# Patient Record
Sex: FEMALE
[De-identification: names, ages, dates, MRNs, and addresses within clinical notes are randomized per-mention and may not be internally consistent; named-entity substitution may affect disease eponyms.]

---

## 2018-07-24 ENCOUNTER — HOSPITAL ENCOUNTER (EMERGENCY)
Dept: HOSPITAL 14 - H.ER | Age: 53
Discharge: HOME | End: 2018-07-24
Payer: COMMERCIAL

## 2018-07-24 VITALS — OXYGEN SATURATION: 99 %

## 2018-07-24 VITALS
RESPIRATION RATE: 16 BRPM | HEART RATE: 64 BPM | DIASTOLIC BLOOD PRESSURE: 88 MMHG | SYSTOLIC BLOOD PRESSURE: 144 MMHG | TEMPERATURE: 98 F

## 2018-07-24 DIAGNOSIS — M25.511: Primary | ICD-10-CM

## 2018-07-24 NOTE — ED PDOC
Upper Extremity Pain/Injury


Time Seen by Provider: 07/24/18 19:23


Chief Complaint (Nursing): Upper Extremity Problem/Injury


Chief Complaint (Provider): Upper Extremity Problem/Injury


History Per: Patient


History/Exam Limitations: no limitations


Onset/Duration Of Symptoms: Days (x15)


Current Symptoms Are (Timing): Still Present


Additional Complaint(s): 


52 y/o female with no significant PMHx presents to the ED complaining of right 

shoulder pain, since 2015. Patient reports pain began after heavy lifting 

objects while at work. Patient reports of taking Ibuprofen with not much 

relief. Patient states pain is worse with abduction.








PMD: None Provided





Past Medical History


Reviewed: Historical Data, Nursing Documentation, Vital Signs


Vital Signs: 


 Last Vital Signs











Temp  98 F   07/24/18 19:10


 


Pulse  62   07/24/18 19:10


 


Resp  18   07/24/18 19:10


 


BP  155/97 H  07/24/18 19:10


 


Pulse Ox  99   07/24/18 19:10














- Medical History


PMH: No Chronic Diseases





- Surgical History


Surgical History: No Surg Hx





- Family History


Family History: States: Unknown Family Hx





- Home Medications


Home Medications: 


 Ambulatory Orders











 Medication  Instructions  Recorded


 


Ibuprofen [Motrin] 600 mg PO Q6 #20 tab 07/24/18














- Allergies


Allergies/Adverse Reactions: 


 Allergies











Allergy/AdvReac Type Severity Reaction Status Date / Time


 


No Known Allergies Allergy   Verified 07/24/18 19:10














Review of Systems


ROS Statement: Except As Marked, All Systems Reviewed And Found Negative


Musculoskeletal: Positive for: Shoulder Pain (right)





Physical Exam





- Reviewed


Nursing Documentation Reviewed: Yes


Vital Signs Reviewed: Yes





- Physical Exam


Appears: Positive for: No Acute Distress


Head Exam: Positive for: ATRAUMATIC, NORMOCEPHALIC


Skin: Positive for: Normal Color, Warm, Dry


Eye Exam: Positive for: Normal appearance, EOMI, PERRL


ENT: Positive for: Normal ENT Inspection


Neck: Positive for: Normal, Painless ROM


Cardiovascular/Chest: Positive for: Regular Rate, Rhythm


Respiratory: Positive for: Normal Breath Sounds.  Negative for: Respiratory 

Distress


Extremity: Positive for: Tenderness (to lateral aspect of shoulder)


Neurologic/Psych: Positive for: Alert, Oriented (x3).  Negative for: Motor/

Sensory Deficits





- ECG


O2 Sat by Pulse Oximetry: 99 (RA)





Medical Decision Making


Medical Decision Making: 


Time: 1942


Plan:


-- Motrin 600 mg PO


-- Right Shoulder XR





XR: (+) mild DJD, NAD, as read by MALI





_____________________________________________________________________________


Scribe Attestation:


Documented by Hedy Mitchell, acting as a scribe for Josette Mcduffie PA-C.








Provider Scribe Attestation:


All medical record entries made by the Scribe were at my direction and 

personally dictated by me. I have reviewed the chart and agree that the record 

accurately reflects my personal performance of the history, physical exam, 

medical decision making, and the department course for this patient. I have 

also personally directed, reviewed, and agree with the discharge instructions 

and disposition.








Disposition





- Clinical Impression


Clinical Impression: 


 Shoulder pain








- Patient ED Disposition


Is Patient to be Admitted: No





- Disposition


Disposition: Routine/Home


Disposition Time: 20:43


Condition: STABLE


Prescriptions: 


Ibuprofen [Motrin] 600 mg PO Q6 #20 tab


Instructions:  Shoulder Pain (DC)


Forms:  [facility] (English)





- POA


Present On Arrival: None

## 2018-07-25 NOTE — RAD
Date of service: 



07/24/2018



PROCEDURE:  Radiographs of the Right Shoulder



HISTORY:

pain, atraumtic







COMPARISON:

No prior.



FINDINGS:



BONES:

No acute fracture or destructive bony lesion identified.



JOINTS:

No subluxation or dislocation.  Limited degenerative changes seen at 

the acromioclavicular joint with the glenohumeral joint unremarkable.



SOFT TISSUES:

Normal.



OTHER FINDINGS:

None. 



IMPRESSION:

No acute fracture or dislocation. Limited degenerative change 

acromioclavicular joint.

## 2018-09-14 ENCOUNTER — HOSPITAL ENCOUNTER (EMERGENCY)
Dept: HOSPITAL 14 - H.ER | Age: 53
Discharge: HOME | End: 2018-09-14
Payer: COMMERCIAL

## 2018-09-14 VITALS — DIASTOLIC BLOOD PRESSURE: 71 MMHG | TEMPERATURE: 98.1 F | SYSTOLIC BLOOD PRESSURE: 114 MMHG

## 2018-09-14 VITALS — RESPIRATION RATE: 18 BRPM | HEART RATE: 67 BPM

## 2018-09-14 VITALS — OXYGEN SATURATION: 99 %

## 2018-09-14 DIAGNOSIS — L29.9: Primary | ICD-10-CM

## 2018-09-14 NOTE — ED PDOC
HPI: General Adult


Time Seen by Provider: 18 15:44


Chief Complaint (Nursing): ENT Problem


Chief Complaint (Provider): itching to eyes and ears


History Per: Patient,  ( [id number])


Additional Complaint(s): 


53-year-old female with no past medical history presents with itching to both 

eyes and ears ongoing for several months. Patient denies any vision or hearing 

loss. No fever or chills. No dizziness or headache.





PMD: none





Past Medical History


Reviewed: Historical Data, Nursing Documentation, Vital Signs


Vital Signs: 





 Last Vital Signs











Temp  98 F   18 15:45


 


Pulse  67   18 15:45


 


Resp  18   18 15:45


 


BP  137/83   18 15:45


 


Pulse Ox  99   18 15:45














- Medical History


PMH: No Chronic Diseases





- Surgical History


Surgical History: 





- Family History


Family History: States: No Known Family Hx





- Living Arrangements


Living Arrangements: With Family





- Social History


Current smoker - smoking cessation education provided: No


Alcohol: None


Drugs: Denies





- Home Medications


Home Medications: 


 Ambulatory Orders











 Medication  Instructions  Recorded


 


Ibuprofen [Motrin] 600 mg PO Q6 #20 tab 18


 


Ketotifen Fumarate [Zaditor] 5 ml OU BID #1 bottle 18


 


hydrOXYzine HCl [Atarax] 25 mg PO Q6H PRN #30 tab 18














- Allergies


Allergies/Adverse Reactions: 


 Allergies











Allergy/AdvReac Type Severity Reaction Status Date / Time


 


No Known Allergies Allergy   Verified 18 15:45














Review of Systems


ROS Statement: Except As Marked, All Systems Reviewed And Found Negative


Constitutional: Negative for: Fever, Chills


Eyes: Positive for: Other (itchiness to both eyes)


ENT: Positive for: Other (itchiness to both ears)


Cardiovascular: Negative for: Chest Pain


Respiratory: Negative for: Cough


Gastrointestinal: Negative for: Nausea, Vomiting


Genitourinary Female: Negative for: Dysuria, Frequency


Neurological: Negative for: Headache, Dizziness





Physical Exam





- Reviewed


Nursing Documentation Reviewed: Yes


Vital Signs Reviewed: Yes





- Physical Exam


Appears: Positive for: Well, Non-toxic, No Acute Distress


Skin: Positive for: Normal Color.  Negative for: Rash


Eye Exam: Positive for: EOMI, PERRL, Conjunctival injection (Mild bilateral 

conjunctival injection to both eyes, no discharge bilaterally)


ENT: Positive for: Normal ENT Inspection, TM Is/Are (TM's intact b/l)


Cardiovascular/Chest: Positive for: Regular Rate, Rhythm


Respiratory: Positive for: Normal Breath Sounds.  Negative for: Wheezing, 

Respiratory Distress


Extremity: Positive for: Normal ROM


Neurologic/Psych: Positive for: Alert, Oriented





- ECG


O2 Sat by Pulse Oximetry: 99


Pulse Ox Interpretation: Normal





Medical Decision Making


Medical Decision Makin y/o with itching to eyes and ears





Plan:


Rx atarax and zaditor eye drops





Patient was referred to clinic for follow up.





Disposition





- Clinical Impression


Clinical Impression: 


 Pruritus, Allergic conjunctivitis








- Patient ED Disposition


Is Patient to be Admitted: No


Counseled Patient/Family Regarding: Diagnosis, Need For Followup, Rx Given





- Disposition


Referrals: 


[facility] [Outside]


Disposition: Routine/Home


Disposition Time: 16:00


Condition: STABLE


Additional Instructions: 


Take rx meds as directed.  Follow up with clinic.


Prescriptions: 


hydrOXYzine HCl [Atarax] 25 mg PO Q6H PRN #30 tab


 PRN Reason: Itching / Pruritus


Ketotifen Fumarate [Zaditor] 5 ml OU BID #1 bottle


Instructions:  Seasonal Allergies (DC), Itchy Skin


Forms:  [facility] (Scottish)


Print Language: Mongolian